# Patient Record
(demographics unavailable — no encounter records)

---

## 2025-04-24 NOTE — CONSULT LETTER
[Dear  ___] : Dear ~ANTWAN, [Courtesy Letter:] : I had the pleasure of seeing your patient, [unfilled], in my office today. [Please see my note below.] : Please see my note below. [Referral Closing:] : Thank you very much for seeing this patient.  If you have any questions, please do not hesitate to contact me. [Sincerely,] : Sincerely, [FreeTextEntry2] : Dr. Maximiliano Smart [FreeTextEntry3] : Nakul Page M.D., F.ANA.C.S., F.A.S.C.R.S. Chief Colorectal Clinical Services, [facility] [DrRomeo  ___] : Dr. TROTTER

## 2025-04-24 NOTE — PHYSICAL EXAM
[Normal Breath Sounds] : Normal breath sounds [Normal Heart Sounds] : normal heart sounds [Alert] : alert [Oriented to Person] : oriented to person [Oriented to Place] : oriented to place [Oriented to Time] : oriented to time [Calm] : calm [Abdomen Masses] : No abdominal masses [Abdomen Tenderness] : ~T No ~M abdominal tenderness [de-identified] : WNL [de-identified] : WNL [de-identified] : LUCILAL [de-identified] : WNL [de-identified] : WNL

## 2025-04-24 NOTE — PHYSICAL EXAM
[No Rash or Lesion] : No rash or lesion [Alert] : alert [Oriented to Person] : oriented to person [Oriented to Place] : oriented to place [Oriented to Time] : oriented to time [de-identified] : Pleasant male, NAD

## 2025-04-24 NOTE — HISTORY OF PRESENT ILLNESS
[FreeTextEntry1] : Gennaro is a 37 year old male being seen for a consultation visit, mucocele vs appendicitis.   Has of Crohns disease Dx in 2021 was on Budesonide for 3 months, currently not on any medications  Colonoscopy 7/19/22 - Multiple ulcers in the terminal ileum and at the ileocecal valve.  Biopsied.  The cecum to rectum is normal.  Internal hemorrhoids.  Several biopsies were obtained in the sigmoid colon, in the descending colon, in the transverse colon and in the ascending colon.    CT A/P on 4/21/25 - 1. Dilated mildly thick-walled appendix with appendiceal tip measures up to 1.2 cm, no significant periappendiceal stranding to suggest acute appendicitis. No oral contrast within appendix. Findings may represent chronic appendiceal inflammation versus mucocele, surgical evaluation recommended. 2. Moderately enlarged right lower quadrant lymph nodes, indeterminate. 3. Minimal pelvic fluid, nonspecific. No peritoneal thickening however continued surveillance recommended.   MRI A+P on 4/17/25 - 1. Mild terminal ileum bowel wall thickening without significant abnormal enhancement, may be seen with chronic inflammatory bowel disease. No penetrating disease or fistulization. 2. Dilated blind-ending tubular structure within right lower quadrant may represent dilated appendix, new from prior. Minimal right lower quadrant pericolic gutter fluid, may be reactive however indeterminate. 3. Mildly enlarged right lower quadrant mesenteric lymph node, may be reactive.  Today patient report feeling intermittent tightness and abdominal discomfort in his right lower abdominal area for 1-2 months.  The last time he felt the symptoms was when he saw Dr. Smart.  Formed BMs daily, recently more loose, rectal bleeding onto toilet bowl and tissue paper about 2-3 months ago, last episode was couple of days ago onto tp.  Does feel prolapsing tissue which he is able to push back in.  No episodes of incontinence of stool or flatus.  Good appetite.  No c/o nausea or vomiting.  Denies fever and chills.  Not on anticoagulants.  Family history of Crohn's disease, cousin and Aunt.  Patient's last colonoscopy was two years ago.

## 2025-04-24 NOTE — HISTORY OF PRESENT ILLNESS
[de-identified] : Mr. Plata is a 36 yo M who presents for initial consultation at the request of Dr. Smart for possible appendicitis.   He was recently seen by GI at which time he reports RLQ pain.   CT A/P on 4/21/25 ([facility]): LUNG BASES: No pleural effusion or pulmonary opacity. LIVER: Smooth hepatic contour. No suspicious focal lesions. Scattered hepatic hypoattenuating lesions too small to characterize. Larger lesion measures water density attenuation consistent with a cyst. No biliary ductal dilatation. No perihepatic ascites. Patent portal hepatic veins. SPLEEN: Spleen is not enlarged. PANCREAS: No pancreatic ductal dilatation. No peripancreatic edema. GALLBLADDER: Mildly distended. No radiopaque cholelithiasis. ADRENALS: No discrete adrenal nodule. LEFT KIDNEY: No hydronephrosis or nephrolithiasis. No ureteral calculus. No solid renal mass. RIGHT KIDNEY: No hydronephrosis or nephrolithiasis. No ureteral calculus. No solid renal mass. PERITONEUM: Minimal pelvic fluid (series 2, image 106, nonspecific. No abdominal hernia. PELVIC VISCERA: There is no bladder wall thickening. Moderately distended bladder is noted. BOWEL: There is no bowel obstruction.. Collapsed terminal ileum limiting evaluation. No significant loop dilatation. Dilated mildly thick-walled appendix with appendiceal tip measures up to 1.2 cm (series 4, image 34), no significant periappendiceal stranding. There is no oral contrast noted within the appendix with oral contrast identified in the cecum. VASCULATURE: No anuerysm. LYMPH NODES: Moderately enlarged right lower quadrant lymph node, reference largest 1.4 cm short axis with additional mildly enlarged right mesenteric and right lower quadrant lymph nodes noted BONES AND SOFT TISSUES: No aggressive osseous lesion is noted. There is no fracture. No vertebral loss of height is noted. IMPRESSION: 1. Dilated mildly thick-walled appendix with appendiceal tip measures up to 1.2 cm, no significant periappendiceal stranding to suggest acute appendicitis. No oral contrast within appendix. Findings may represent chronic appendiceal inflammation versus mucocele, surgical evaluation recommended. 2. Moderately enlarged right lower quadrant lymph nodes, indeterminate. 3. Minimal pelvic fluid, nonspecific. No peritoneal thickening however continued surveillance recommended   MRI A/P on 4/17/25 ([facility]): 1. Mild terminal ileum bowel wall thickening without significant abnormal enhancement, may be seen with chronic inflammatory bowel disease. No penetrating disease or fistulization. 2. Dilated blind-ending tubular structure within right lower quadrant may represent dilated appendix, new from prior. Minimal right lower quadrant pericolic gutter fluid, may be reactive however indeterminate.  3. Mildly enlarged right lower quadrant mesenteric lymph node, may be reactive.  PMHx: Hx of Crohn's PSHx: hip surgery

## 2025-04-24 NOTE — ASSESSMENT
[FreeTextEntry1] : I have seen and evaluated the patient, and I have corroborated all nursing input into this note.  The patient's thickened appendix may be from Crohn's disease.  However, neuroendocrine tumors and mucinous tumors (LAMN and HAMN) are in the differential diagnosis.  Therefore, an appendectomy is indicated.  Risks, benefits, and alternatives for a laparoscopic possible open appendectomy possible ileocecectomy reviewed including but not limited to bleeding, infection, and staple line leak.  All questions were answered.

## 2025-04-24 NOTE — PHYSICAL EXAM
[Normal Breath Sounds] : Normal breath sounds [Normal Heart Sounds] : normal heart sounds [Alert] : alert [Oriented to Person] : oriented to person [Oriented to Place] : oriented to place [Oriented to Time] : oriented to time [Calm] : calm [Abdomen Masses] : No abdominal masses [Abdomen Tenderness] : ~T No ~M abdominal tenderness [de-identified] : WNL [de-identified] : WNL [de-identified] : LUCILAL [de-identified] : WNL [de-identified] : WNL

## 2025-04-24 NOTE — CONSULT LETTER
[Dear  ___] : Dear ~ANTWAN, [Courtesy Letter:] : I had the pleasure of seeing your patient, [unfilled], in my office today. [Please see my note below.] : Please see my note below. [Referral Closing:] : Thank you very much for seeing this patient.  If you have any questions, please do not hesitate to contact me. [Sincerely,] : Sincerely, [FreeTextEntry2] : Dr. Maximiliano Smart [FreeTextEntry3] : Nakul Page M.D., F.ANA.C.S., F.A.S.C.R.S. Chief Colorectal Clinical Services, [facility] [DrRomeo  ___] : Dr. TROTTER

## 2025-05-13 NOTE — HISTORY OF PRESENT ILLNESS
[FreeTextEntry1] : Gennaro is a 37 year old male being seen for a post-op visit, s/p Laparoscopic appendectomy on 4/29/25 for thickened appendix and history of Crohn's disease Pathology: 1. Appendix, appendectomy: - Acute appendicitis  Has of Crohns disease Dx in 2021 was on Budesonide for 3 months, currently not on any medications  Colonoscopy 7/19/22 - Multiple ulcers in the terminal ileum and at the ileocecal valve. Biopsied. The cecum to rectum is normal. Internal hemorrhoids. Several biopsies were obtained in the sigmoid colon, in the descending colon, in the transverse colon and in the ascending colon.  Last seen 4/24/25 - I have seen and evaluated the patient, and I have corroborated all nursing input into this note. The patient's thickened appendix may be from Crohn's disease. However, neuroendocrine tumors and mucinous tumors (LAMN and HAMN) are in the differential diagnosis. Therefore, an appendectomy is indicated. Risks, benefits, and alternatives for a laparoscopic possible open appendectomy possible ileocecectomy reviewed including but not limited to bleeding, infection, and staple line leak. All questions were answered.

## 2025-05-15 NOTE — PHYSICAL EXAM
[Abdomen Masses] : No abdominal masses [Abdomen Tenderness] : ~T No ~M abdominal tenderness [de-identified] : Normal wound healing

## 2025-05-15 NOTE — HISTORY OF PRESENT ILLNESS
[FreeTextEntry1] : Gennaro is a 37 year old male being seen for a post-op visit, s/p Laparoscopic appendectomy on 4/29/25 for thickened appendix and history of Crohn's disease Pathology: 1. Appendix, appendectomy: - Acute appendicitis  Has of Crohns disease Dx in 2021 was on Budesonide for 3 months, currently not on any medications  Colonoscopy 7/19/22 - Multiple ulcers in the terminal ileum and at the ileocecal valve. Biopsied. The cecum to rectum is normal. Internal hemorrhoids. Several biopsies were obtained in the sigmoid colon, in the descending colon, in the transverse colon and in the ascending colon.  Today patient denies surgical incisional pain.  BMs formed once daily, takes Metamucil.  Good appetite.  Denies nausea or vomiting.  No fever or chills.  Surgical incision with no redness, swelling, or drainage.

## 2025-05-15 NOTE — PHYSICAL EXAM
[Abdomen Masses] : No abdominal masses [Abdomen Tenderness] : ~T No ~M abdominal tenderness [de-identified] : Normal wound healing